# Patient Record
Sex: MALE | Race: WHITE | Employment: STUDENT | URBAN - METROPOLITAN AREA
[De-identification: names, ages, dates, MRNs, and addresses within clinical notes are randomized per-mention and may not be internally consistent; named-entity substitution may affect disease eponyms.]

---
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
## 2025-01-20 DIAGNOSIS — E55.9 VITAMIN D DEFICIENCY: Primary | ICD-10-CM

## 2025-01-20 DIAGNOSIS — Z20.2 STD EXPOSURE: ICD-10-CM

## 2025-01-27 DIAGNOSIS — E55.9 VITAMIN D DEFICIENCY: ICD-10-CM

## 2025-01-27 DIAGNOSIS — Z20.2 STD EXPOSURE: ICD-10-CM

## 2025-01-27 LAB
25(OH)D3 SERPL-MCNC: 39.3 NG/ML
HCV AB SERPL QL IA: NORMAL

## 2025-01-28 LAB
HIV 1+2 AB+HIV1 P24 AG SERPL QL IA: NORMAL
HIV 2 AB SERPL QL IA: NORMAL
HIV1 AB SERPL QL IA: NORMAL
HIV1 P24 AG SERPL QL IA: NORMAL
REAGIN+T PALLIDUM IGG+IGM SERPL-IMP: NORMAL